# Patient Record
Sex: MALE | Race: WHITE | NOT HISPANIC OR LATINO | Employment: FULL TIME | URBAN - METROPOLITAN AREA
[De-identification: names, ages, dates, MRNs, and addresses within clinical notes are randomized per-mention and may not be internally consistent; named-entity substitution may affect disease eponyms.]

---

## 2018-06-01 DIAGNOSIS — B36.0 TINEA VERSICOLOR: ICD-10-CM

## 2018-06-01 RX ORDER — KETOCONAZOLE 200 MG/1
200 TABLET ORAL DAILY
Qty: 30 TABLET | Refills: 0 | Status: SHIPPED | OUTPATIENT
Start: 2018-06-01 | End: 2018-10-01 | Stop reason: SDUPTHER

## 2018-10-01 ENCOUNTER — OFFICE VISIT (OUTPATIENT)
Dept: FAMILY MEDICINE CLINIC | Facility: CLINIC | Age: 43
End: 2018-10-01

## 2018-10-01 VITALS
HEART RATE: 67 BPM | DIASTOLIC BLOOD PRESSURE: 82 MMHG | BODY MASS INDEX: 22.45 KG/M2 | HEIGHT: 70 IN | SYSTOLIC BLOOD PRESSURE: 110 MMHG | OXYGEN SATURATION: 98 % | WEIGHT: 156.8 LBS

## 2018-10-01 DIAGNOSIS — B36.0 TINEA VERSICOLOR: ICD-10-CM

## 2018-10-01 PROCEDURE — 99212 OFFICE O/P EST SF 10 MIN: CPT | Performed by: INTERNAL MEDICINE

## 2018-10-01 RX ORDER — KETOCONAZOLE 200 MG/1
200 TABLET ORAL DAILY
Qty: 30 TABLET | Refills: 0 | Status: SHIPPED | OUTPATIENT
Start: 2018-10-01

## 2018-10-01 NOTE — PROGRESS NOTES
"Subjective   Jon Crespo is a 43 y.o. male who presents today for:    Skin problem    History of Present Illness     He was diagnosed and treated with for tinea versicolor in 2/2016.  This resolved fully with ketoconazole at that time.    White spots developed on his skin 4-6 months ago and progressively spread over his trunk and right upper extremity.  She denies itching.    Mr. Crespo  reports that he has never smoked. He has never used smokeless tobacco. He reports that he drinks about 2.4 oz of alcohol per week . He reports that he does not use drugs.     No Known Allergies    Current Outpatient Prescriptions:   •  Multiple Vitamin (MULTIVITAMIN) tablet, Take 1 tablet by mouth daily., Disp: , Rfl:   •  ketoconazole (NIZORAL) 200 MG tablet, Take 1 tablet by mouth Daily., Disp: 30 tablet, Rfl: 0      Review of Systems    No fever or chills.  No pruritus.  No malaise.  No joint swelling.    Objective   Vitals:    10/01/18 1633   BP: 110/82   BP Location: Left arm   Patient Position: Sitting   Cuff Size: Adult   Pulse: 67   SpO2: 98%   Weight: 71.1 kg (156 lb 12.8 oz)   Height: 177.8 cm (70\")     Physical Exam    Multiple flat, depigmented macules, some of which are coalescing.  These extend over the right arm and forearm, abdomen and thorax, both anteriorly and posteriorly.  No surrounding erythema.  No significant scaling.  No cervical, supraclavicular, or axillary lymphadenopathy.  Regular rate and rhythm.  No murmur.  Chest is clear bilaterally.  Abdomen is soft and nontender.      Jon was seen today for annual exam.    Diagnoses and all orders for this visit:    Tinea versicolor  -     ketoconazole (NIZORAL) 200 MG tablet; Take 1 tablet by mouth Daily.    Take the ketoconazole as per patient instructions.    Patient reports good readings on his spirometry screening done through work.  I have asked him to forward those results to us.  Follow-up in approximately 2 years for preventive health evaluation.  "

## 2018-10-01 NOTE — PATIENT INSTRUCTIONS
Ketoconazole 200 m tablet daily for 1 week.  Wait 1 month before taking another week's worth of medicine, and only take it if your skin has not cleared up by then.     If not resolved by 18, call the office for an alternative medication.